# Patient Record
Sex: MALE | Race: WHITE | NOT HISPANIC OR LATINO | Employment: FULL TIME | URBAN - METROPOLITAN AREA
[De-identification: names, ages, dates, MRNs, and addresses within clinical notes are randomized per-mention and may not be internally consistent; named-entity substitution may affect disease eponyms.]

---

## 2017-04-04 ENCOUNTER — APPOINTMENT (OUTPATIENT)
Dept: URGENT CARE | Facility: CLINIC | Age: 42
End: 2017-04-04
Payer: COMMERCIAL

## 2017-04-06 ENCOUNTER — HOSPITAL ENCOUNTER (OUTPATIENT)
Dept: LAB | Facility: MEDICAL CENTER | Age: 42
End: 2017-04-06
Attending: UROLOGY
Payer: COMMERCIAL

## 2017-04-06 ENCOUNTER — HOSPITAL ENCOUNTER (OUTPATIENT)
Dept: LAB | Facility: MEDICAL CENTER | Age: 42
End: 2017-04-06
Attending: NURSE PRACTITIONER
Payer: COMMERCIAL

## 2017-04-06 DIAGNOSIS — I10 ESSENTIAL HYPERTENSION: ICD-10-CM

## 2017-04-06 DIAGNOSIS — R53.83 OTHER FATIGUE: ICD-10-CM

## 2017-04-06 DIAGNOSIS — Z76.89 ENCOUNTER TO ESTABLISH CARE WITH NEW DOCTOR: ICD-10-CM

## 2017-04-06 DIAGNOSIS — E78.5 HYPERLIPIDEMIA, UNSPECIFIED HYPERLIPIDEMIA TYPE: ICD-10-CM

## 2017-04-06 LAB
25(OH)D3 SERPL-MCNC: 26 NG/ML (ref 30–100)
ALBUMIN SERPL BCP-MCNC: 4.4 G/DL (ref 3.2–4.9)
ALBUMIN/GLOB SERPL: 1.3 G/DL
ALP SERPL-CCNC: 51 U/L (ref 30–99)
ALT SERPL-CCNC: 25 U/L (ref 2–50)
ANION GAP SERPL CALC-SCNC: 8 MMOL/L (ref 0–11.9)
AST SERPL-CCNC: 15 U/L (ref 12–45)
BASOPHILS # BLD AUTO: 1.4 % (ref 0–1.8)
BASOPHILS # BLD: 0.09 K/UL (ref 0–0.12)
BILIRUB SERPL-MCNC: 0.6 MG/DL (ref 0.1–1.5)
BUN SERPL-MCNC: 15 MG/DL (ref 8–22)
CALCIUM SERPL-MCNC: 9.4 MG/DL (ref 8.5–10.5)
CHLORIDE SERPL-SCNC: 108 MMOL/L (ref 96–112)
CHOLEST SERPL-MCNC: 231 MG/DL (ref 100–199)
CO2 SERPL-SCNC: 24 MMOL/L (ref 20–33)
CREAT SERPL-MCNC: 0.82 MG/DL (ref 0.5–1.4)
EOSINOPHIL # BLD AUTO: 0.07 K/UL (ref 0–0.51)
EOSINOPHIL NFR BLD: 1.1 % (ref 0–6.9)
ERYTHROCYTE [DISTWIDTH] IN BLOOD BY AUTOMATED COUNT: 46.6 FL (ref 35.9–50)
GFR SERPL CREATININE-BSD FRML MDRD: >60 ML/MIN/1.73 M 2
GLOBULIN SER CALC-MCNC: 3.3 G/DL (ref 1.9–3.5)
GLUCOSE SERPL-MCNC: 90 MG/DL (ref 65–99)
HCT VFR BLD AUTO: 47.7 % (ref 42–52)
HDLC SERPL-MCNC: 42 MG/DL
HGB BLD-MCNC: 15.3 G/DL (ref 14–18)
IMM GRANULOCYTES # BLD AUTO: 0.04 K/UL (ref 0–0.11)
IMM GRANULOCYTES NFR BLD AUTO: 0.6 % (ref 0–0.9)
LDLC SERPL CALC-MCNC: 167 MG/DL
LYMPHOCYTES # BLD AUTO: 2.06 K/UL (ref 1–4.8)
LYMPHOCYTES NFR BLD: 32.1 % (ref 22–41)
MCH RBC QN AUTO: 29.6 PG (ref 27–33)
MCHC RBC AUTO-ENTMCNC: 32.1 G/DL (ref 33.7–35.3)
MCV RBC AUTO: 92.3 FL (ref 81.4–97.8)
MONOCYTES # BLD AUTO: 0.42 K/UL (ref 0–0.85)
MONOCYTES NFR BLD AUTO: 6.6 % (ref 0–13.4)
NEUTROPHILS # BLD AUTO: 3.73 K/UL (ref 1.82–7.42)
NEUTROPHILS NFR BLD: 58.2 % (ref 44–72)
NRBC # BLD AUTO: 0 K/UL
NRBC BLD AUTO-RTO: 0 /100 WBC
PLATELET # BLD AUTO: 196 K/UL (ref 164–446)
PMV BLD AUTO: 11.5 FL (ref 9–12.9)
POTASSIUM SERPL-SCNC: 4.1 MMOL/L (ref 3.6–5.5)
PROT SERPL-MCNC: 7.7 G/DL (ref 6–8.2)
RBC # BLD AUTO: 5.17 M/UL (ref 4.7–6.1)
SODIUM SERPL-SCNC: 140 MMOL/L (ref 135–145)
TRIGL SERPL-MCNC: 108 MG/DL (ref 0–149)
TSH SERPL DL<=0.005 MIU/L-ACNC: 2.98 UIU/ML (ref 0.3–3.7)
WBC # BLD AUTO: 6.4 K/UL (ref 4.8–10.8)

## 2017-04-06 PROCEDURE — 82306 VITAMIN D 25 HYDROXY: CPT

## 2017-04-06 PROCEDURE — 80053 COMPREHEN METABOLIC PANEL: CPT

## 2017-04-06 PROCEDURE — 36415 COLL VENOUS BLD VENIPUNCTURE: CPT

## 2017-04-06 PROCEDURE — 80061 LIPID PANEL: CPT

## 2017-04-06 PROCEDURE — 85025 COMPLETE CBC W/AUTO DIFF WBC: CPT

## 2017-04-06 PROCEDURE — 84443 ASSAY THYROID STIM HORMONE: CPT

## 2017-04-06 PROCEDURE — 84153 ASSAY OF PSA TOTAL: CPT

## 2017-04-06 PROCEDURE — 84154 ASSAY OF PSA FREE: CPT

## 2017-04-08 LAB
PSA FREE MFR SERPL: 15 %
PSA FREE SERPL-MCNC: 1 NG/ML
PSA SERPL-MCNC: 6.6 NG/ML (ref 0–4)

## 2017-05-24 ENCOUNTER — TELEPHONE (OUTPATIENT)
Dept: MEDICAL GROUP | Facility: PHYSICIAN GROUP | Age: 42
End: 2017-05-24

## 2017-05-24 NOTE — TELEPHONE ENCOUNTER
ESTABLISHED PATIENT PRE-VISIT PLANNING     Note: Patient will not be contacted if there is no indication to call.     1.  Reviewed note from last office visit with PCP and/or other med group provider: Yes    2.  If any orders were placed at last visit, do we have Results/Consult Notes?        •  Labs - Labs ordered, completed and results are in chart.       •  Imaging - Imaging was not ordered at last office visit.       •  Referrals - No referrals were ordered at last office visit.    3.  Immunizations were updated in Epic using WebIZ?: No WebIZ record       •  Web Iz Recommendations: TDAP    4.  Patient is due for the following Health Maintenance Topics:   Health Maintenance Due   Topic Date Due   • IMM DTaP/Tdap/Td Vaccine (1 - Tdap) 01/12/1992

## 2017-05-25 ENCOUNTER — OFFICE VISIT (OUTPATIENT)
Dept: MEDICAL GROUP | Facility: PHYSICIAN GROUP | Age: 42
End: 2017-05-25
Payer: COMMERCIAL

## 2017-05-25 VITALS
HEIGHT: 72 IN | TEMPERATURE: 99 F | WEIGHT: 283 LBS | HEART RATE: 84 BPM | DIASTOLIC BLOOD PRESSURE: 80 MMHG | RESPIRATION RATE: 16 BRPM | SYSTOLIC BLOOD PRESSURE: 118 MMHG | BODY MASS INDEX: 38.33 KG/M2 | OXYGEN SATURATION: 95 %

## 2017-05-25 DIAGNOSIS — R60.9 EDEMA, UNSPECIFIED TYPE: ICD-10-CM

## 2017-05-25 DIAGNOSIS — Z23 NEED FOR VACCINATION: ICD-10-CM

## 2017-05-25 PROCEDURE — 99214 OFFICE O/P EST MOD 30 MIN: CPT | Mod: 25 | Performed by: NURSE PRACTITIONER

## 2017-05-25 PROCEDURE — 90715 TDAP VACCINE 7 YRS/> IM: CPT | Performed by: NURSE PRACTITIONER

## 2017-05-25 PROCEDURE — 90471 IMMUNIZATION ADMIN: CPT | Performed by: NURSE PRACTITIONER

## 2017-05-25 RX ORDER — HYDROCHLOROTHIAZIDE 12.5 MG/1
12.5 TABLET ORAL DAILY
Qty: 30 TAB | Refills: 3 | Status: SHIPPED | OUTPATIENT
Start: 2017-05-25 | End: 2017-09-18 | Stop reason: SDUPTHER

## 2017-05-25 NOTE — ASSESSMENT & PLAN NOTE
Has been noticing that his legs are both swollen at the end of the day.  He reports that they are back to normal when he wakes up in the morning.  Is on his feet all day, and then walks or goes to the gym after work.  Will work on elevating his legs after work, and will consider compression socks during the day.  We will start HCTZ 12.5 daily to see if this helps.  He will monitor his blood pressure.

## 2017-05-25 NOTE — PROGRESS NOTES
Chief Complaint   Patient presents with   • Other     water retention    • Injections     tdap        HISTORY OF PRESENT ILLNESS: Patient is a 42 y.o. male established patient who presents today to discuss the following issues:    Need for vaccination  Due for Tdap.    Edema  Has been noticing that his legs are both swollen at the end of the day.  He reports that they are back to normal when he wakes up in the morning.  Is on his feet all day, and then walks or goes to the gym after work.  Will work on elevating his legs after work, and will consider compression socks during the day.  We will start HCTZ 12.5 daily to see if this helps.  He will monitor his blood pressure.    BMI 38.0-38.9,adult  Patient is aware of BMI elevation.  Brief discussion of diet, exercise, and lifestyle modification.        Patient Active Problem List    Diagnosis Date Noted   • Edema 05/25/2017   • BMI 38.0-38.9,adult 05/25/2017   • Need for vaccination 05/25/2017   • Essential hypertension 12/08/2016   • Hyperlipidemia 12/08/2016       Allergies:Review of patient's allergies indicates no known allergies.    Current Outpatient Prescriptions   Medication Sig Dispense Refill   • vitamin D (CHOLECALCIFEROL) 1000 UNIT Tab Take 1,000 Units by mouth every day.     • hydrochlorothiazide (HYDRODIURIL) 12.5 MG tablet Take 1 Tab by mouth every day. 30 Tab 3   • oxybutynin SR (DITROPAN-XL) 10 MG CR tablet Take 10 mg by mouth every day.     • tamsulosin (FLOMAX) 0.4 MG capsule Take 0.4 mg by mouth ONE-HALF HOUR AFTER BREAKFAST.     • amlodipine (NORVASC) 10 MG Tab Take 1 Tab by mouth every day. 90 Tab 3   • lovastatin (MEVACOR) 20 MG Tab Take 1 Tab by mouth every day. 90 Tab 3     No current facility-administered medications for this visit.       Social History   Substance Use Topics   • Smoking status: Never Smoker    • Smokeless tobacco: Never Used   • Alcohol Use: 0.0 oz/week     0 Standard drinks or equivalent per week      Comment: socially  "      No family status information on file.   History reviewed. No pertinent family history.    Review of Systems:   Constitutional: Negative for fever, chills, weight loss and malaise/fatigue.   HENT: Negative for ear pain, nosebleeds, congestion, sore throat and neck pain.    Eyes: Negative for blurred vision.   Respiratory: Negative for cough, sputum production, shortness of breath and wheezing.    Cardiovascular: Negative for chest pain, palpitations, and orthopnea.  Positive for bilateral leg swelling.   Gastrointestinal: Negative for heartburn, nausea, vomiting and abdominal pain.   Genitourinary: Negative for dysuria, urgency and frequency.   Musculoskeletal: Negative for myalgias, joint pain, and back pain.  Skin: Negative for rash and itching.   Neurological: Negative for dizziness, tingling, tremors, sensory change, focal weakness and headaches.   Endo/Heme/Allergies: Does not bruise/bleed easily.   Psychiatric/Behavioral: Negative for depression, suicidal ideas and memory loss.  The patient is not nervous/anxious and does not have insomnia.    All other systems reviewed and are negative except as in HPI.    Exam:  Blood pressure 118/80, pulse 84, temperature 37.2 °C (99 °F), resp. rate 16, height 1.829 m (6' 0.01\"), weight 128.368 kg (283 lb), SpO2 95 %.  General:  Well nourished, well developed male in NAD  Head: Grossly normal.  Neck: Supple without JVD or bruit. Thyroid is not enlarged.  Pulmonary: Clear to ausculation. Normal effort. No rales, ronchi, or wheezing.  Cardiovascular: Regular rate and rhythm without murmur.   Extremities: No clubbing and cyanosis.  Positive for 1+ bilateral ankle pitting edema.  Skin: Intact with no obvious rashes or lesions.  Neuro: Grossly intact.  Psych: Alert and oriented x 3.  Mood and affect appropriate.    Medical decision-making and discussion: Jeb is here today to discuss ankle edema.  A prescription for HCTZ was sent to his pharmacy, and he was given a Tdap " injection.  He will follow-up here as needed.    I have placed the below orders and discussed them with an approved delegating provider. The MA is performing the below orders under the direction of Dr. Rodríguez, who have provided verbal consent for supervision.            Assessment/Plan:  1. BMI 38.0-38.9,adult  Patient identified as having weight management issue.  Appropriate orders and counseling given.   2. Need for vaccination  Tdap =>8yo IM   3. Edema, unspecified type  hydrochlorothiazide (HYDRODIURIL) 12.5 MG tablet       Return if symptoms worsen or fail to improve.    Please note that this dictation was created using voice recognition software. I have made every reasonable attempt to correct obvious errors, but I expect that there are errors of grammar and possibly content that I did not discover before finalizing the note.

## 2017-05-25 NOTE — MR AVS SNAPSHOT
"        Jeb Morley   2017 10:00 AM   Office Visit   MRN: [id number]    Department:  [facility]   Dept Phone:  [phone]    Description:  Male : 1975   Provider:  SONI Fish           Reason for Visit     Other water retention     Injections tdap       Allergies as of 2017     No Known Allergies      You were diagnosed with     BMI 38.0-38.9,adult   [939139]       Need for vaccination   [875871]       Edema, unspecified type   [[id number]]         Vital Signs     Blood Pressure Pulse Temperature Respirations Height Weight    118/80 mmHg 84 37.2 °C (99 °F) 16 1.829 m (6' 0.01\") 128.368 kg (283 lb)    Body Mass Index Oxygen Saturation Smoking Status             38.37 kg/m2 95% Never Smoker          Basic Information     Date Of Birth Sex Race Ethnicity Preferred Language    1975 Male White Non- English      Problem List              ICD-10-CM Priority Class Noted - Resolved    Essential hypertension I10   2016 - Present    Hyperlipidemia E78.5   2016 - Present    Edema R60.9   2017 - Present    BMI 38.0-38.9,adult Z68.38   2017 - Present    Need for vaccination Z23   2017 - Present      Health Maintenance        Date Due Completion Dates    IMM DTaP/Tdap/Td Vaccine (1 - Tdap) 1992 ---            Current Immunizations     Tdap Vaccine  Incomplete      Below and/or attached are the medications your provider expects you to take. Review all of your home medications and newly ordered medications with your provider and/or pharmacist. Follow medication instructions as directed by your provider and/or pharmacist. Please keep your medication list with you and share with your provider. Update the information when medications are discontinued, doses are changed, or new medications (including over-the-counter products) are added; and carry medication information at all times in the event of emergency situations     Allergies:  No Known Allergies "          Medications  Valid as of: May 25, 2017 - 10:37 AM    Generic Name Brand Name Tablet Size Instructions for use    AmLODIPine Besylate (Tab) NORVASC 10 MG Take 1 Tab by mouth every day.        Cholecalciferol (Tab) cholecalciferol 1000 UNIT Take 1,000 Units by mouth every day.        HydroCHLOROthiazide (Tab) HYDRODIURIL 12.5 MG Take 1 Tab by mouth every day.        Lovastatin (Tab) MEVACOR 20 MG Take 1 Tab by mouth every day.        Oxybutynin Chloride (TABLET SR 24 HR) DITROPAN-XL 10 MG Take 10 mg by mouth every day.        Tamsulosin HCl (Cap) FLOMAX 0.4 MG Take 0.4 mg by mouth ONE-HALF HOUR AFTER BREAKFAST.        .                 Medicines prescribed today were sent to:     [facility] [address] [place] [address] - [address] [facility] [address]    Phone: [phone] Fax: [phone]    Open 24 Hours?: No      Medication refill instructions:       If your prescription bottle indicates you have medication refills left, it is not necessary to call your provider’s office. Please contact your pharmacy and they will refill your medication.    If your prescription bottle indicates you do not have any refills left, you may request refills at any time through one of the following ways: The online [facility] system (except Urgent Care), by calling your provider’s office, or by asking your pharmacy to contact your provider’s office with a refill request. Medication refills are processed only during regular business hours and may not be available until the next business day. Your provider may request additional information or to have a follow-up visit with you prior to refilling your medication.   *Please Note: Medication refills are assigned a new Rx number when refilled electronically. Your pharmacy may indicate that no refills were authorized even though a new prescription for the same medication is available at the pharmacy. Please request the medicine by name with the pharmacy before contacting your  provider for a refill.           MyChart Access Code: Activation code not generated  Current MyChart Status: Active

## 2017-09-05 ENCOUNTER — OFFICE VISIT (OUTPATIENT)
Dept: MEDICAL GROUP | Facility: PHYSICIAN GROUP | Age: 42
End: 2017-09-05
Payer: COMMERCIAL

## 2017-09-05 VITALS
RESPIRATION RATE: 20 BRPM | DIASTOLIC BLOOD PRESSURE: 80 MMHG | OXYGEN SATURATION: 96 % | WEIGHT: 280 LBS | TEMPERATURE: 102.6 F | HEIGHT: 72 IN | HEART RATE: 116 BPM | BODY MASS INDEX: 37.93 KG/M2 | SYSTOLIC BLOOD PRESSURE: 122 MMHG

## 2017-09-05 DIAGNOSIS — R50.9 FEVER, UNSPECIFIED FEVER CAUSE: ICD-10-CM

## 2017-09-05 DIAGNOSIS — B34.9 VIRAL SYNDROME: ICD-10-CM

## 2017-09-05 PROBLEM — Z23 NEED FOR VACCINATION: Status: RESOLVED | Noted: 2017-05-25 | Resolved: 2017-09-05

## 2017-09-05 PROBLEM — R60.9 EDEMA: Status: RESOLVED | Noted: 2017-05-25 | Resolved: 2017-09-05

## 2017-09-05 LAB
FLUAV+FLUBV AG SPEC QL IA: NEGATIVE
INT CON NEG: NEGATIVE
INT CON POS: POSITIVE

## 2017-09-05 PROCEDURE — 87804 INFLUENZA ASSAY W/OPTIC: CPT | Performed by: NURSE PRACTITIONER

## 2017-09-05 PROCEDURE — 99214 OFFICE O/P EST MOD 30 MIN: CPT | Performed by: NURSE PRACTITIONER

## 2017-09-05 RX ORDER — OSELTAMIVIR PHOSPHATE 75 MG/1
75 CAPSULE ORAL 2 TIMES DAILY
Qty: 10 CAP | Refills: 0 | Status: SHIPPED
Start: 2017-09-05 | End: 2020-03-16

## 2017-09-05 NOTE — PROGRESS NOTES
Chief Complaint   Patient presents with   • Fever     x4 days    • Migraine     x4days        HISTORY OF PRESENT ILLNESS: Patient is a 42 y.o. male established patient who presents today to discuss the following issues:    Fever  Started 2 days ago with significant fever and chills.  Feels terrible. Complains os headache and body aches.  Rare dry cough and dizziness.  No congestion or sore throat.  POCT influenza was negative, but I will treat empirically based on symptoms.      Patient Active Problem List    Diagnosis Date Noted   • Fever 09/05/2017   • BMI 38.0-38.9,adult 05/25/2017   • Essential hypertension 12/08/2016   • Hyperlipidemia 12/08/2016       Allergies:Review of patient's allergies indicates no known allergies.    Current Outpatient Prescriptions   Medication Sig Dispense Refill   • oseltamivir (TAMIFLU) 75 MG Cap Take 1 Cap by mouth 2 times a day. 10 Cap 0   • vitamin D (CHOLECALCIFEROL) 1000 UNIT Tab Take 1,000 Units by mouth every day.     • hydrochlorothiazide (HYDRODIURIL) 12.5 MG tablet Take 1 Tab by mouth every day. 30 Tab 3   • oxybutynin SR (DITROPAN-XL) 10 MG CR tablet Take 10 mg by mouth every day.     • tamsulosin (FLOMAX) 0.4 MG capsule Take 0.4 mg by mouth ONE-HALF HOUR AFTER BREAKFAST.     • amlodipine (NORVASC) 10 MG Tab Take 1 Tab by mouth every day. 90 Tab 3   • lovastatin (MEVACOR) 20 MG Tab Take 1 Tab by mouth every day. 90 Tab 3     No current facility-administered medications for this visit.        Social History   Substance Use Topics   • Smoking status: Never Smoker   • Smokeless tobacco: Never Used   • Alcohol use 0.0 oz/week      Comment: socially       No family status information on file.   History reviewed. No pertinent family history.    Review of Systems:   Constitutional: Positive for fever, chills, and malaise/fatigue.   HENT: Negative for ear pain, nosebleeds, congestion, sore throat and neck pain.    Eyes: Negative for blurred vision.   Respiratory: Negative for  cough, sputum production, shortness of breath and wheezing.    Cardiovascular: Negative for chest pain, palpitations, orthopnea and leg swelling.   Gastrointestinal: Negative for heartburn, nausea, vomiting and abdominal pain.   Genitourinary: Negative for dysuria, urgency and frequency.   Musculoskeletal: Negative for joint pain and back pain.Positive for myalgias.  Skin: Negative for rash and itching.   Neurological: Negative for dizziness, tingling, tremors, sensory change, focal weakness and headaches.   Endo/Heme/Allergies: Does not bruise/bleed easily.   Psychiatric/Behavioral: Negative for depression, suicidal ideas and memory loss.  The patient is not nervous/anxious and does not have insomnia.    All other systems reviewed and are negative except as in HPI.    Exam:  Blood pressure 122/80, pulse (!) 116, temperature (!) 39.2 °C (102.6 °F), resp. rate 20, height 1.829 m (6'), weight (!) 127 kg (280 lb), SpO2 96 %.  General:  Well nourished, well developed male in NAD  Head: Grossly normal.  Neck: Supple without JVD or bruit. Thyroid is not enlarged.  Pulmonary: Clear to ausculation. Normal effort. No rales, ronchi, or wheezing.  Cardiovascular: Regular rate and rhythm without murmur.   Extremities: No clubbing, cyanosis, or edema.  Skin: Intact with no obvious rashes or lesions.  Neuro: Grossly intact.  Psych: Alert and oriented x 3.  Mood and affect appropriate.    Medical decision-making and discussion: Jeb is here today to discuss fever and body aches.  A POCT flu was negative, but a prescription for tamiflu was sent to his pharmacy.  He was encouraged to rest, stay hydrated, and to treat his symptoms with otc meds.  He will follow-up if he is worse/no better.            Assessment/Plan:  1. Fever, unspecified fever cause  oseltamivir (TAMIFLU) 75 MG Cap   2. Viral syndrome  oseltamivir (TAMIFLU) 75 MG Cap       Return if symptoms worsen or fail to improve.    Please note that this dictation was  created using voice recognition software. I have made every reasonable attempt to correct obvious errors, but I expect that there are errors of grammar and possibly content that I did not discover before finalizing the note.

## 2017-09-05 NOTE — ASSESSMENT & PLAN NOTE
Started 2 days ago with significant fever and chills.  Feels terrible. Complains os headache and body aches.  Rare dry cough and dizziness.  No congestion or sore throat.  POCT influenza was negative, but I will treat empirically based on symptoms.

## 2017-09-07 ENCOUNTER — OFFICE VISIT (OUTPATIENT)
Dept: URGENT CARE | Facility: PHYSICIAN GROUP | Age: 42
End: 2017-09-07
Payer: COMMERCIAL

## 2017-09-07 VITALS
RESPIRATION RATE: 16 BRPM | DIASTOLIC BLOOD PRESSURE: 78 MMHG | OXYGEN SATURATION: 97 % | HEART RATE: 92 BPM | SYSTOLIC BLOOD PRESSURE: 128 MMHG | BODY MASS INDEX: 37.79 KG/M2 | TEMPERATURE: 100.9 F | HEIGHT: 72 IN | WEIGHT: 279 LBS

## 2017-09-07 DIAGNOSIS — L03.115 CELLULITIS OF RIGHT LOWER EXTREMITY: ICD-10-CM

## 2017-09-07 PROCEDURE — 99213 OFFICE O/P EST LOW 20 MIN: CPT | Performed by: FAMILY MEDICINE

## 2017-09-07 RX ORDER — CLINDAMYCIN HYDROCHLORIDE 300 MG/1
300 CAPSULE ORAL 3 TIMES DAILY
Qty: 30 CAP | Refills: 0 | Status: SHIPPED | OUTPATIENT
Start: 2017-09-07 | End: 2017-09-17

## 2017-09-07 ASSESSMENT — ENCOUNTER SYMPTOMS
EYE REDNESS: 0
FOCAL WEAKNESS: 0
EYE DISCHARGE: 0
CHILLS: 1
SENSORY CHANGE: 0

## 2017-09-07 NOTE — PROGRESS NOTES
"Subjective:      Jeb Morley is a 42 y.o. male who presents with Leg Pain (upper R thigh) and Headache (top of head, coughing and movement makes it worse)            4 days \"bump\" right upper thigh. Red, moderately painful, warm. Progressively worsening. No red streaking. Has had recent fevers and HA suspected to be related to viral illness. Mild cough. Previous N/V resolved. No function or sensory loss. No neck stiffness. No purulent drainage. No other aggravating or alleviating factors.            Review of Systems   Constitutional: Positive for chills and malaise/fatigue.   Eyes: Negative for discharge and redness.   Skin: Negative for itching and rash.   Neurological: Negative for sensory change and focal weakness.     .  Medications, Allergies, and current problem list reviewed today in Epic       Objective:     /78   Pulse 92   Temp (!) 38.3 °C (100.9 °F)   Resp 16   Ht 1.829 m (6')   Wt (!) 126.6 kg (279 lb)   SpO2 97%   BMI 37.84 kg/m²      Physical Exam   Constitutional: He appears well-developed and well-nourished. No distress.   HENT:   Head: Normocephalic and atraumatic.   Musculoskeletal:        Legs:  Neurological:   Speech is clear. Patient is appropriate and cooperative.     Skin: Skin is warm and dry.               Assessment/Plan:     1. Cellulitis of right lower extremity    - clindamycin (CLEOCIN) 300 MG Cap; Take 1 Cap by mouth 3 times a day for 10 days.  Dispense: 30 Cap; Refill: 0  - Differential diagnosis, natural history, supportive care, and indications for immediate follow-up discussed at length.     "

## 2017-09-18 DIAGNOSIS — R60.9 EDEMA, UNSPECIFIED TYPE: ICD-10-CM

## 2017-09-19 RX ORDER — HYDROCHLOROTHIAZIDE 12.5 MG/1
TABLET ORAL
Qty: 90 TAB | Refills: 1 | Status: SHIPPED | OUTPATIENT
Start: 2017-09-19 | End: 2018-03-10 | Stop reason: SDUPTHER

## 2017-09-19 NOTE — TELEPHONE ENCOUNTER
Was the patient seen in the last year in this department? Yes     Does patient have an active prescription for medications requested? No     Received Request Via: Pharmacy      Pt met protocol?: Yes, OV last week   BP Readings from Last 1 Encounters:   09/07/17 128/78

## 2017-09-19 NOTE — TELEPHONE ENCOUNTER
Refill X 6 months, sent to pharmacy.Pt. Seen in the last 6 months per protocol.   Lab Results   Component Value Date/Time    SODIUM 140 04/06/2017 08:34 AM    POTASSIUM 4.1 04/06/2017 08:34 AM    CHLORIDE 108 04/06/2017 08:34 AM    CO2 24 04/06/2017 08:34 AM    GLUCOSE 90 04/06/2017 08:34 AM    BUN 15 04/06/2017 08:34 AM    CREATININE 0.82 04/06/2017 08:34 AM

## 2017-10-18 ENCOUNTER — HOSPITAL ENCOUNTER (OUTPATIENT)
Dept: LAB | Facility: MEDICAL CENTER | Age: 42
End: 2017-10-18
Attending: UROLOGY
Payer: COMMERCIAL

## 2017-10-18 LAB — PSA SERPL-MCNC: 8.56 NG/ML (ref 0–4)

## 2017-10-18 PROCEDURE — 84153 ASSAY OF PSA TOTAL: CPT

## 2017-10-18 PROCEDURE — 36415 COLL VENOUS BLD VENIPUNCTURE: CPT

## 2017-11-06 ENCOUNTER — OFFICE VISIT (OUTPATIENT)
Dept: MEDICAL GROUP | Facility: PHYSICIAN GROUP | Age: 42
End: 2017-11-06
Payer: COMMERCIAL

## 2017-11-06 VITALS
RESPIRATION RATE: 16 BRPM | WEIGHT: 288 LBS | TEMPERATURE: 98.4 F | HEART RATE: 80 BPM | HEIGHT: 72 IN | SYSTOLIC BLOOD PRESSURE: 128 MMHG | DIASTOLIC BLOOD PRESSURE: 80 MMHG | OXYGEN SATURATION: 94 % | BODY MASS INDEX: 39.01 KG/M2

## 2017-11-06 DIAGNOSIS — N40.1 BENIGN PROSTATIC HYPERPLASIA WITH LOWER URINARY TRACT SYMPTOMS, SYMPTOM DETAILS UNSPECIFIED: ICD-10-CM

## 2017-11-06 PROCEDURE — 99213 OFFICE O/P EST LOW 20 MIN: CPT | Performed by: NURSE PRACTITIONER

## 2017-11-06 RX ORDER — PHENTERMINE HYDROCHLORIDE 37.5 MG/1
37.5 TABLET ORAL
Qty: 30 TAB | Refills: 0 | Status: SHIPPED | OUTPATIENT
Start: 2017-11-06 | End: 2017-12-13 | Stop reason: SDUPTHER

## 2017-11-08 PROBLEM — N40.1 BENIGN PROSTATIC HYPERPLASIA WITH LOWER URINARY TRACT SYMPTOMS: Status: ACTIVE | Noted: 2017-11-08

## 2017-11-08 PROBLEM — R50.9 FEVER: Status: RESOLVED | Noted: 2017-09-05 | Resolved: 2017-11-08

## 2017-11-08 NOTE — ASSESSMENT & PLAN NOTE
Patient is aware of BMI elevation.  Brief discussion of diet, exercise, and lifestyle modification.  He would like to restart phentermine.  He has done well with it in the past without side effects.

## 2017-11-08 NOTE — PROGRESS NOTES
Chief Complaint   Patient presents with   • Follow-Up     water pill        HISTORY OF PRESENT ILLNESS: Patient is a 42 y.o. male established patient who presents today to discuss the following issues:    BMI 39.0-39.9,adult  Patient is aware of BMI elevation.  Brief discussion of diet, exercise, and lifestyle modification.  He would like to restart phentermine.  He has done well with it in the past without side effects.      Benign prostatic hyperplasia with lower urinary tract symptoms  Followed by urology. Is due to have an MRI soon, as his PSA has increased.      Patient Active Problem List    Diagnosis Date Noted   • Benign prostatic hyperplasia with lower urinary tract symptoms 11/08/2017   • BMI 39.0-39.9,adult 05/25/2017   • Essential hypertension 12/08/2016   • Hyperlipidemia 12/08/2016       Allergies:Review of patient's allergies indicates no known allergies.    Current Outpatient Prescriptions   Medication Sig Dispense Refill   • phentermine (ADIPEX-P) 37.5 MG tablet Take 1 Tab by mouth every morning before breakfast. 30 Tab 0   • hydrochlorothiazide (HYDRODIURIL) 12.5 MG tablet TAKE ONE TABLET BY MOUTH ONCE DAILY 90 Tab 1   • vitamin D (CHOLECALCIFEROL) 1000 UNIT Tab Take 1,000 Units by mouth every day.     • oxybutynin SR (DITROPAN-XL) 10 MG CR tablet Take 10 mg by mouth every day.     • tamsulosin (FLOMAX) 0.4 MG capsule Take 0.4 mg by mouth ONE-HALF HOUR AFTER BREAKFAST.     • amlodipine (NORVASC) 10 MG Tab Take 1 Tab by mouth every day. 90 Tab 3   • lovastatin (MEVACOR) 20 MG Tab Take 1 Tab by mouth every day. 90 Tab 3   • oseltamivir (TAMIFLU) 75 MG Cap Take 1 Cap by mouth 2 times a day. 10 Cap 0     No current facility-administered medications for this visit.        Social History   Substance Use Topics   • Smoking status: Never Smoker   • Smokeless tobacco: Never Used   • Alcohol use 0.0 oz/week      Comment: socially       No family status information on file.   History reviewed. No pertinent  family history.    Review of Systems:   Constitutional: Negative for fever, chills, weight loss and malaise/fatigue.   HENT: Negative for ear pain, nosebleeds, congestion, sore throat and neck pain.    Eyes: Negative for blurred vision.   Respiratory: Negative for cough, sputum production, shortness of breath and wheezing.    Cardiovascular: Negative for chest pain, palpitations, orthopnea and leg swelling.   Gastrointestinal: Negative for heartburn, nausea, vomiting and abdominal pain.   Genitourinary: Negative for dysuria, urgency and frequency.   Musculoskeletal: Negative for myalgias, joint pain, and back pain.  Skin: Negative for rash and itching.   Neurological: Negative for dizziness, tingling, tremors, sensory change, focal weakness and headaches.   Endo/Heme/Allergies: Does not bruise/bleed easily.   Psychiatric/Behavioral: Negative for depression, suicidal ideas and memory loss.  The patient is not nervous/anxious and does not have insomnia.    All other systems reviewed and are negative except as in HPI.    Exam:  Blood pressure 128/80, pulse 80, temperature 36.9 °C (98.4 °F), resp. rate 16, height 1.829 m (6'), weight (!) 130.6 kg (288 lb), SpO2 94 %.  General:  Well nourished, well developed male in NAD  Head: Grossly normal.  Neck: Supple without JVD or bruit. Thyroid is not enlarged.  Pulmonary: Clear to ausculation. Normal effort. No rales, ronchi, or wheezing.  Cardiovascular: Regular rate and rhythm without murmur.   Extremities: No clubbing, cyanosis, or edema.  Skin: Intact with no obvious rashes or lesions.  Vascular dermatitis on bilateral lower extremities.  Neuro: Grossly intact.  Psych: Alert and oriented x 3.  Mood and affect appropriate.    Medical decision-making and discussion: Jeb is here today to discuss a few issues.  He will continue to see urology, and he was given a prescription for phentermine.  He will follow-up here as needed.          Assessment/Plan:  1. BMI  39.0-39.9,adult  phentermine (ADIPEX-P) 37.5 MG tablet   2. Benign prostatic hyperplasia with lower urinary tract symptoms, symptom details unspecified         Return if symptoms worsen or fail to improve.    Please note that this dictation was created using voice recognition software. I have made every reasonable attempt to correct obvious errors, but I expect that there are errors of grammar and possibly content that I did not discover before finalizing the note.

## 2017-12-08 DIAGNOSIS — I10 ESSENTIAL HYPERTENSION: ICD-10-CM

## 2017-12-08 DIAGNOSIS — E78.5 HYPERLIPIDEMIA, UNSPECIFIED HYPERLIPIDEMIA TYPE: ICD-10-CM

## 2017-12-08 RX ORDER — LOVASTATIN 20 MG/1
TABLET ORAL
Qty: 90 TAB | Refills: 1 | Status: SHIPPED | OUTPATIENT
Start: 2017-12-08 | End: 2018-03-22 | Stop reason: SDUPTHER

## 2017-12-08 RX ORDER — AMLODIPINE BESYLATE 10 MG/1
TABLET ORAL
Qty: 90 TAB | Refills: 1 | Status: SHIPPED | OUTPATIENT
Start: 2017-12-08 | End: 2018-03-22 | Stop reason: SDUPTHER

## 2017-12-09 NOTE — TELEPHONE ENCOUNTER
Pt has had OV within the 12 month protocol and lipid panel is current. 6 month supply sent to pharmacy.   Lab Results   Component Value Date/Time    CHOLSTRLTOT 231 (H) 04/06/2017 08:34 AM     (H) 04/06/2017 08:34 AM    HDL 42 04/06/2017 08:34 AM    TRIGLYCERIDE 108 04/06/2017 08:34 AM       Lab Results   Component Value Date/Time    SODIUM 140 04/06/2017 08:34 AM    POTASSIUM 4.1 04/06/2017 08:34 AM    CHLORIDE 108 04/06/2017 08:34 AM    CO2 24 04/06/2017 08:34 AM    GLUCOSE 90 04/06/2017 08:34 AM    BUN 15 04/06/2017 08:34 AM    CREATININE 0.82 04/06/2017 08:34 AM     Lab Results   Component Value Date/Time    ALKPHOSPHAT 51 04/06/2017 08:34 AM    ASTSGOT 15 04/06/2017 08:34 AM    ALTSGPT 25 04/06/2017 08:34 AM    TBILIRUBIN 0.6 04/06/2017 08:34 AM

## 2017-12-12 ENCOUNTER — HOSPITAL ENCOUNTER (OUTPATIENT)
Dept: LAB | Facility: MEDICAL CENTER | Age: 42
End: 2017-12-12
Attending: UROLOGY
Payer: COMMERCIAL

## 2017-12-12 PROCEDURE — 36415 COLL VENOUS BLD VENIPUNCTURE: CPT

## 2017-12-12 PROCEDURE — 84153 ASSAY OF PSA TOTAL: CPT

## 2017-12-12 PROCEDURE — 84154 ASSAY OF PSA FREE: CPT

## 2017-12-13 NOTE — TELEPHONE ENCOUNTER
Was the patient seen in the last year in this department? Yes 11/06/17    Does patient have an active prescription for medications requested? No     Received Request Via: Pharmacy

## 2017-12-14 LAB
PSA FREE MFR SERPL: 13 %
PSA FREE SERPL-MCNC: 1.3 NG/ML
PSA SERPL-MCNC: 9.8 NG/ML (ref 0–4)

## 2017-12-14 RX ORDER — PHENTERMINE HYDROCHLORIDE 37.5 MG/1
TABLET ORAL
Qty: 30 TAB | Refills: 0 | Status: SHIPPED
Start: 2017-12-14 | End: 2018-01-13

## 2018-01-24 ENCOUNTER — HOSPITAL ENCOUNTER (OUTPATIENT)
Dept: RADIOLOGY | Facility: MEDICAL CENTER | Age: 43
End: 2018-01-24
Attending: UROLOGY
Payer: COMMERCIAL

## 2018-01-24 DIAGNOSIS — R97.20 ELEVATED PROSTATE SPECIFIC ANTIGEN (PSA): ICD-10-CM

## 2018-01-24 PROCEDURE — 700111 HCHG RX REV CODE 636 W/ 250 OVERRIDE (IP): Performed by: RADIOLOGY

## 2018-01-24 PROCEDURE — 700117 HCHG RX CONTRAST REV CODE 255: Performed by: UROLOGY

## 2018-01-24 PROCEDURE — A9579 GAD-BASE MR CONTRAST NOS,1ML: HCPCS | Performed by: UROLOGY

## 2018-01-24 PROCEDURE — 72197 MRI PELVIS W/O & W/DYE: CPT

## 2018-01-24 RX ADMIN — GADODIAMIDE 20 ML: 287 INJECTION INTRAVENOUS at 09:49

## 2018-01-24 RX ADMIN — GLUCAGON HYDROCHLORIDE 1 MG: KIT at 08:39

## 2018-02-28 ENCOUNTER — PATIENT MESSAGE (OUTPATIENT)
Dept: MEDICAL GROUP | Facility: PHYSICIAN GROUP | Age: 43
End: 2018-02-28

## 2018-03-10 ENCOUNTER — TELEPHONE (OUTPATIENT)
Dept: MEDICAL GROUP | Facility: PHYSICIAN GROUP | Age: 43
End: 2018-03-10

## 2018-03-10 DIAGNOSIS — E78.5 HYPERLIPIDEMIA, UNSPECIFIED HYPERLIPIDEMIA TYPE: ICD-10-CM

## 2018-03-10 DIAGNOSIS — R60.9 EDEMA, UNSPECIFIED TYPE: ICD-10-CM

## 2018-03-10 DIAGNOSIS — I10 ESSENTIAL HYPERTENSION: ICD-10-CM

## 2018-03-12 RX ORDER — HYDROCHLOROTHIAZIDE 12.5 MG/1
TABLET ORAL
Qty: 90 TAB | Refills: 0 | Status: SHIPPED | OUTPATIENT
Start: 2018-03-12 | End: 2018-03-22 | Stop reason: SDUPTHER

## 2018-03-23 DIAGNOSIS — I10 ESSENTIAL HYPERTENSION: ICD-10-CM

## 2018-03-23 NOTE — TELEPHONE ENCOUNTER
Was the patient seen in the last year in this department? Yes     Does patient have an active prescription for medications requested? No     Received Request Via: Pharmacy      Pt met protocol?: Yes     Ov 11/17

## 2018-03-26 RX ORDER — AMLODIPINE BESYLATE 10 MG/1
TABLET ORAL
Qty: 90 TAB | Refills: 0 | Status: SHIPPED | OUTPATIENT
Start: 2018-03-26 | End: 2018-08-17 | Stop reason: SDUPTHER

## 2018-05-30 ENCOUNTER — HOSPITAL ENCOUNTER (OUTPATIENT)
Dept: LAB | Facility: MEDICAL CENTER | Age: 43
End: 2018-05-30
Attending: NURSE PRACTITIONER
Payer: COMMERCIAL

## 2018-05-30 DIAGNOSIS — E78.5 HYPERLIPIDEMIA, UNSPECIFIED HYPERLIPIDEMIA TYPE: ICD-10-CM

## 2018-05-30 DIAGNOSIS — I10 ESSENTIAL HYPERTENSION: ICD-10-CM

## 2018-05-30 LAB
25(OH)D3 SERPL-MCNC: 15 NG/ML (ref 30–100)
ALBUMIN SERPL BCP-MCNC: 4.4 G/DL (ref 3.2–4.9)
ALBUMIN/GLOB SERPL: 1.6 G/DL
ALP SERPL-CCNC: 48 U/L (ref 30–99)
ALT SERPL-CCNC: 25 U/L (ref 2–50)
ANION GAP SERPL CALC-SCNC: 9 MMOL/L (ref 0–11.9)
AST SERPL-CCNC: 13 U/L (ref 12–45)
BILIRUB SERPL-MCNC: 0.5 MG/DL (ref 0.1–1.5)
BUN SERPL-MCNC: 14 MG/DL (ref 8–22)
CALCIUM SERPL-MCNC: 9.3 MG/DL (ref 8.5–10.5)
CHLORIDE SERPL-SCNC: 106 MMOL/L (ref 96–112)
CHOLEST SERPL-MCNC: 241 MG/DL (ref 100–199)
CO2 SERPL-SCNC: 26 MMOL/L (ref 20–33)
CREAT SERPL-MCNC: 0.86 MG/DL (ref 0.5–1.4)
ERYTHROCYTE [DISTWIDTH] IN BLOOD BY AUTOMATED COUNT: 50 FL (ref 35.9–50)
GLOBULIN SER CALC-MCNC: 2.7 G/DL (ref 1.9–3.5)
GLUCOSE SERPL-MCNC: 78 MG/DL (ref 65–99)
HCT VFR BLD AUTO: 49.4 % (ref 42–52)
HDLC SERPL-MCNC: 50 MG/DL
HGB BLD-MCNC: 15.5 G/DL (ref 14–18)
LDLC SERPL CALC-MCNC: 151 MG/DL
MCH RBC QN AUTO: 30.1 PG (ref 27–33)
MCHC RBC AUTO-ENTMCNC: 31.4 G/DL (ref 33.7–35.3)
MCV RBC AUTO: 95.9 FL (ref 81.4–97.8)
PLATELET # BLD AUTO: 209 K/UL (ref 164–446)
PMV BLD AUTO: 11.6 FL (ref 9–12.9)
POTASSIUM SERPL-SCNC: 4.3 MMOL/L (ref 3.6–5.5)
PROT SERPL-MCNC: 7.1 G/DL (ref 6–8.2)
RBC # BLD AUTO: 5.15 M/UL (ref 4.7–6.1)
SODIUM SERPL-SCNC: 141 MMOL/L (ref 135–145)
TRIGL SERPL-MCNC: 201 MG/DL (ref 0–149)
TSH SERPL DL<=0.005 MIU/L-ACNC: 3.06 UIU/ML (ref 0.38–5.33)
WBC # BLD AUTO: 9 K/UL (ref 4.8–10.8)

## 2018-05-30 PROCEDURE — 80053 COMPREHEN METABOLIC PANEL: CPT

## 2018-05-30 PROCEDURE — 36415 COLL VENOUS BLD VENIPUNCTURE: CPT

## 2018-05-30 PROCEDURE — 85027 COMPLETE CBC AUTOMATED: CPT

## 2018-05-30 PROCEDURE — 84443 ASSAY THYROID STIM HORMONE: CPT

## 2018-05-30 PROCEDURE — 82306 VITAMIN D 25 HYDROXY: CPT

## 2018-05-30 PROCEDURE — 80061 LIPID PANEL: CPT

## 2018-06-06 RX ORDER — ERGOCALCIFEROL 1.25 MG/1
CAPSULE ORAL
Qty: 16 CAP | Refills: 0 | Status: SHIPPED | OUTPATIENT
Start: 2018-06-06

## 2018-07-17 ENCOUNTER — PATIENT MESSAGE (OUTPATIENT)
Dept: MEDICAL GROUP | Facility: PHYSICIAN GROUP | Age: 43
End: 2018-07-17

## 2018-07-17 RX ORDER — LOSARTAN POTASSIUM 25 MG/1
25 TABLET ORAL DAILY
Qty: 90 TAB | Refills: 3 | Status: SHIPPED | OUTPATIENT
Start: 2018-07-17 | End: 2018-07-20 | Stop reason: SDUPTHER

## 2018-07-20 ENCOUNTER — PATIENT MESSAGE (OUTPATIENT)
Dept: MEDICAL GROUP | Facility: PHYSICIAN GROUP | Age: 43
End: 2018-07-20

## 2018-07-20 DIAGNOSIS — I10 ESSENTIAL HYPERTENSION: ICD-10-CM

## 2018-07-20 RX ORDER — LOSARTAN POTASSIUM 25 MG/1
25 TABLET ORAL DAILY
Qty: 90 TAB | Refills: 3 | Status: SHIPPED
Start: 2018-07-20 | End: 2018-07-24 | Stop reason: SDUPTHER

## 2018-07-20 RX ORDER — LOSARTAN POTASSIUM 25 MG/1
25 TABLET ORAL DAILY
Qty: 90 TAB | Refills: 3 | Status: SHIPPED | OUTPATIENT
Start: 2018-07-20 | End: 2018-07-20 | Stop reason: SDUPTHER

## 2018-07-20 NOTE — TELEPHONE ENCOUNTER
From: Jeb Morley  To: Stefani Rodríguez M.D.  Sent: 7/20/2018 3:15 PM PDT  Subject: RE: Non-Urgent Medical Question    Stefani  So she just called the [address] [place] [address] and they said nothing was there for him & it is now 3:20.    ----- Message -----  From: Stefani Rodríguez M.D.  Sent: 7/20/18 10:13 AM  To: Jeb Morley  Subject: RE: Non-Urgent Medical Question    [facility] [address] [place] [address]. I will resend it.    Stefani    ----- Message -----   From: Jeb Morley   Sent: 7/17/2018 3:29 PM PDT   To: Stefani Rodríguez M.D.  Subject: RE: Non-Urgent Medical Question    Stefani  Yes that is what she is talking about. She just got a 90 day supply from [facility] so what do we do with that?  Jeb    ----- Message -----  From: Stefani Rodríguez M.D.  Sent: 7/17/18 3:17 PM  To: Jeb Morley  Subject: RE: Non-Urgent Medical Question    HI there. Marlo is out currently. I don't see which medication you are referring to (I assume Diovan aka Valsartan) on his medication list. Can you just confirm that is one that he was taking. We can switch it to another drug.    Stefani    ----- Message -----   From: Jeb Morley   Sent: 7/17/2018 9:38 AM PDT   To: SONI Fuller  Subject: Non-Urgent Medical Question    Morning Marlo,   So mom is having a fit about the news that dad's blood pressure meds are being pulled off the market, so she has been on the phone with the pharmacy they said yes it and for her to contact her doctor! Yeah kenyatta is rolling down hill and your the catcher! So can you call her or let me know what to have her do now. Her number is [phone] thank you!

## 2018-07-24 ENCOUNTER — PATIENT MESSAGE (OUTPATIENT)
Dept: MEDICAL GROUP | Facility: PHYSICIAN GROUP | Age: 43
End: 2018-07-24

## 2018-07-24 RX ORDER — LOSARTAN POTASSIUM 25 MG/1
25 TABLET ORAL DAILY
Qty: 90 TAB | Refills: 3 | Status: SHIPPED | OUTPATIENT
Start: 2018-07-24 | End: 2020-03-16

## 2018-07-24 RX ORDER — LOSARTAN POTASSIUM 25 MG/1
25 TABLET ORAL DAILY
Qty: 90 TAB | Refills: 3 | Status: SHIPPED | OUTPATIENT
Start: 2018-07-24 | End: 2018-07-24 | Stop reason: SDUPTHER

## 2018-07-25 NOTE — TELEPHONE ENCOUNTER
From: Jeb Morley  To: Stefani Rodríguez M.D.  Sent: 7/24/2018 3:41 PM PDT  Subject: RE: Non-Urgent Medical Question    Stefani   Ok I know I'm being a pain in the ass! But as of today at 3:30 [facility] on [facility] says they still don't have a prescription for Dad! Do you think we can send it to the [facility] on [facility] instead!   Jeb  ----- Message -----  From: Stefani Rodríguez M.D.  Sent: 7/20/18 3:19 PM  To: Jeb Morley  Subject: RE: Non-Urgent Medical Question    OH holy moly! I sent it about 3 times now. I will fax it and call it in. Sorry for the technical difficulty.     ----- Message -----   From: Jeb Morley   Sent: 7/20/2018 3:15 PM PDT   To: Stefani Rodríguez M.D.  Subject: RE: Non-Urgent Medical Question    Stefani  So she just called the [address] [place] [address] [facility] and they said nothing was there for him & it is now 3:20.    ----- Message -----  From: Stefani Rodríguez M.D.  Sent: 7/20/18 10:13 AM  To: Jeb Morley  Subject: RE: Non-Urgent Medical Question    [facility] to [facility] on [address]. I will resend it.    Stefani    ----- Message -----   From: Jeb Morley   Sent: 7/17/2018 3:29 PM PDT   To: Stefani Rodríguez M.D.  Subject: RE: Non-Urgent Medical Question    Stefani  Yes that is what she is talking about. She just got a 90 day supply from [facility] so what do we do with that?  Jeb    ----- Message -----  From: Stefani Rodríguez M.D.  Sent: 7/17/18 3:17 PM  To: Jeb Morley  Subject: RE: Non-Urgent Medical Question    HI there. Marlo is out currently. I don't see which medication you are referring to (I assume Diovan aka Valsartan) on his medication list. Can you just confirm that is one that he was taking. We can switch it to another drug.    Stefani    ----- Message -----   From: Jeb Morley   Sent: 7/17/2018 9:38 AM PDT   To: Marlo Barahona, A.P.N.  Subject: Non-Urgent Medical Question    Morning Marlo,   So mom is having a fit about the  news that dad's blood pressure meds are being pulled off the market, so she has been on the phone with the pharmacy they said yes it and for her to contact her doctor! Yeah leydithony is rolling down hill and your the catcher! So can you call her or let me know what to have her do now. Her number is [phone] thank you!

## 2018-09-06 ENCOUNTER — OFFICE VISIT (OUTPATIENT)
Dept: MEDICAL GROUP | Facility: PHYSICIAN GROUP | Age: 43
End: 2018-09-06
Payer: COMMERCIAL

## 2018-09-06 VITALS
SYSTOLIC BLOOD PRESSURE: 130 MMHG | HEIGHT: 72 IN | DIASTOLIC BLOOD PRESSURE: 80 MMHG | WEIGHT: 295 LBS | HEART RATE: 99 BPM | TEMPERATURE: 98.3 F | RESPIRATION RATE: 16 BRPM | BODY MASS INDEX: 39.96 KG/M2 | OXYGEN SATURATION: 97 %

## 2018-09-06 DIAGNOSIS — I10 ESSENTIAL HYPERTENSION: ICD-10-CM

## 2018-09-06 DIAGNOSIS — R79.89 LOW VITAMIN D LEVEL: ICD-10-CM

## 2018-09-06 DIAGNOSIS — E78.5 HYPERLIPIDEMIA, UNSPECIFIED HYPERLIPIDEMIA TYPE: ICD-10-CM

## 2018-09-06 DIAGNOSIS — N40.1 BENIGN PROSTATIC HYPERPLASIA WITH LOWER URINARY TRACT SYMPTOMS, SYMPTOM DETAILS UNSPECIFIED: ICD-10-CM

## 2018-09-06 PROCEDURE — 99214 OFFICE O/P EST MOD 30 MIN: CPT | Performed by: NURSE PRACTITIONER

## 2018-09-06 ASSESSMENT — PATIENT HEALTH QUESTIONNAIRE - PHQ9: CLINICAL INTERPRETATION OF PHQ2 SCORE: 0

## 2018-09-06 NOTE — PROGRESS NOTES
Chief Complaint   Patient presents with   • Results       HISTORY OF PRESENT ILLNESS: Patient is a 43 y.o. male established patient who presents today to discuss the following issues:    BMI 40.0-44.9, adult ([facility])  Patient is aware of BMI elevation.  Brief discussion of diet, exercise, and lifestyle modification.      Hyperlipidemia  Chronic in nature.  Stable on meds.  Due for labs.    Essential hypertension  Chronic in nature.  Stable on meds.      Benign prostatic hyperplasia with lower urinary tract symptoms  Followed by urology.      Patient Active Problem List    Diagnosis Date Noted   • BMI 40.0-44.9, adult ([facility]) 10/08/2018   • Benign prostatic hyperplasia with lower urinary tract symptoms 11/08/2017   • Essential hypertension 12/08/2016   • Hyperlipidemia 12/08/2016       Allergies:Patient has no known allergies.    Current Outpatient Prescriptions   Medication Sig Dispense Refill   • lovastatin (MEVACOR) 20 MG Tab TAKE ONE TABLET BY MOUTH ONCE DAILY 90 Tab 0   • amLODIPine (NORVASC) 10 MG Tab TAKE ONE TABLET BY MOUTH ONCE DAILY 90 Tab 0   • hydroCHLOROthiazide (HYDRODIURIL) 12.5 MG tablet TAKE ONE TABLET BY MOUTH ONCE DAILY 90 Tab 0   • losartan (COZAAR) 25 MG Tab Take 1 Tab by mouth every day. 90 Tab 3   • ergocalciferol (DRISDOL) 05284 UNIT capsule Take 1 cap by mouth every day for 10 days, and then take 1 cap by mouth every Tuesday and Saturday until prescription is completed. 16 Cap 0   • oseltamivir (TAMIFLU) 75 MG Cap Take 1 Cap by mouth 2 times a day. 10 Cap 0   • vitamin D (CHOLECALCIFEROL) 1000 UNIT Tab Take 1,000 Units by mouth every day.     • oxybutynin SR (DITROPAN-XL) 10 MG CR tablet Take 10 mg by mouth every day.     • tamsulosin (FLOMAX) 0.4 MG capsule Take 0.4 mg by mouth ONE-HALF HOUR AFTER BREAKFAST.       No current facility-administered medications for this visit.        Social History   Substance Use Topics   • Smoking status: Never Smoker   • Smokeless tobacco: Never Used   • Alcohol  use 0.0 oz/week      Comment: socially       No family status information on file.   History reviewed. No pertinent family history.    Review of Systems:   Constitutional: Negative for fever, chills, weight loss and malaise/fatigue.   HENT: Negative for ear pain, nosebleeds, congestion, sore throat and neck pain.    Eyes: Negative for blurred vision.   Respiratory: Negative for cough, sputum production, shortness of breath and wheezing.    Cardiovascular: Negative for chest pain, palpitations, orthopnea and leg swelling.   Gastrointestinal: Negative for heartburn, nausea, vomiting and abdominal pain.   Genitourinary: Negative for dysuria, urgency and frequency.   Musculoskeletal: Negative for myalgias, joint pain, and back pain.  Skin: Negative for rash and itching.   Neurological: Negative for dizziness, tingling, tremors, sensory change, focal weakness and headaches.   Endo/Heme/Allergies: Does not bruise/bleed easily.   Psychiatric/Behavioral: Negative for depression, suicidal ideas and memory loss.  The patient is not nervous/anxious and does not have insomnia.    All other systems reviewed and are negative except as in HPI.    Exam:  Blood pressure 130/80, pulse 99, temperature 36.8 °C (98.3 °F), resp. rate 16, height 1.829 m (6'), weight (!) 133.8 kg (295 lb), SpO2 97 %.  General:  Well nourished, well developed male in NAD  Head: Grossly normal.  Neck: Supple without JVD or bruit. Thyroid is not enlarged.  Pulmonary: Clear to ausculation. Normal effort. No rales, ronchi, or wheezing.  Cardiovascular: Regular rate and rhythm without murmur.   Abdomen:  Soft, nontender, nondistended.  Extremities: No clubbing, cyanosis, or edema.  Skin: Intact with no obvious rashes or lesions.  Neuro: Grossly intact.  Psych: Alert and oriented x 3.  Mood and affect appropriate.    Medical decision-making and discussion: Jeb is here today for follow-upLab work was ordered, and he will follow-up here as  needed.          Assessment/Plan:  1. BMI 40.0-44.9, adult ([facility]CC)  Patient identified as having weight management issue.  Appropriate orders and counseling given.   2. Hyperlipidemia, unspecified hyperlipidemia type  LIPID PROFILE   3. Low vitamin D level  VITAMIN D,25 HYDROXY   4. Essential hypertension     5. Benign prostatic hyperplasia with lower urinary tract symptoms, symptom details unspecified         Return if symptoms worsen or fail to improve.    Please note that this dictation was created using voice recognition software. I have made every reasonable attempt to correct obvious errors, but I expect that there are errors of grammar and possibly content that I did not discover before finalizing the note.

## 2018-11-21 DIAGNOSIS — I10 ESSENTIAL HYPERTENSION: ICD-10-CM

## 2018-11-21 DIAGNOSIS — R60.9 EDEMA, UNSPECIFIED TYPE: ICD-10-CM

## 2018-11-21 DIAGNOSIS — E78.5 HYPERLIPIDEMIA, UNSPECIFIED HYPERLIPIDEMIA TYPE: ICD-10-CM

## 2018-11-26 RX ORDER — HYDROCHLOROTHIAZIDE 12.5 MG/1
TABLET ORAL
Refills: 0 | OUTPATIENT
Start: 2018-11-26

## 2018-11-26 RX ORDER — LOVASTATIN 20 MG/1
TABLET ORAL
Refills: 0 | OUTPATIENT
Start: 2018-11-26

## 2018-11-26 RX ORDER — AMLODIPINE BESYLATE 10 MG/1
TABLET ORAL
Refills: 0 | OUTPATIENT
Start: 2018-11-26

## 2018-12-18 ENCOUNTER — HOSPITAL ENCOUNTER (OUTPATIENT)
Dept: LAB | Facility: MEDICAL CENTER | Age: 43
End: 2018-12-18
Attending: NURSE PRACTITIONER
Payer: COMMERCIAL

## 2018-12-18 DIAGNOSIS — E78.5 HYPERLIPIDEMIA, UNSPECIFIED HYPERLIPIDEMIA TYPE: ICD-10-CM

## 2018-12-18 DIAGNOSIS — R79.89 LOW VITAMIN D LEVEL: ICD-10-CM

## 2018-12-18 LAB
25(OH)D3 SERPL-MCNC: 33 NG/ML (ref 30–100)
CHOLEST SERPL-MCNC: 217 MG/DL (ref 100–199)
FASTING STATUS PATIENT QL REPORTED: NORMAL
HDLC SERPL-MCNC: 44 MG/DL
LDLC SERPL CALC-MCNC: 152 MG/DL
TRIGL SERPL-MCNC: 107 MG/DL (ref 0–149)

## 2018-12-18 PROCEDURE — 36415 COLL VENOUS BLD VENIPUNCTURE: CPT

## 2018-12-18 PROCEDURE — 80061 LIPID PANEL: CPT

## 2018-12-18 PROCEDURE — 82306 VITAMIN D 25 HYDROXY: CPT

## 2019-01-13 ENCOUNTER — OFFICE VISIT (OUTPATIENT)
Dept: URGENT CARE | Facility: MEDICAL CENTER | Age: 44
End: 2019-01-13
Payer: COMMERCIAL

## 2019-01-13 VITALS
DIASTOLIC BLOOD PRESSURE: 98 MMHG | WEIGHT: 295 LBS | SYSTOLIC BLOOD PRESSURE: 124 MMHG | TEMPERATURE: 97.9 F | RESPIRATION RATE: 16 BRPM | HEART RATE: 97 BPM | OXYGEN SATURATION: 96 % | BODY MASS INDEX: 39.96 KG/M2 | HEIGHT: 72 IN

## 2019-01-13 DIAGNOSIS — J02.9 PHARYNGITIS, UNSPECIFIED ETIOLOGY: ICD-10-CM

## 2019-01-13 PROCEDURE — 99214 OFFICE O/P EST MOD 30 MIN: CPT | Performed by: FAMILY MEDICINE

## 2019-01-13 RX ORDER — AMOXICILLIN 875 MG/1
875 TABLET, COATED ORAL EVERY 12 HOURS
Qty: 20 TAB | Refills: 0 | Status: SHIPPED | OUTPATIENT
Start: 2019-01-13 | End: 2019-01-23

## 2019-01-13 ASSESSMENT — ENCOUNTER SYMPTOMS
SHORTNESS OF BREATH: 0
FOCAL WEAKNESS: 0
CHILLS: 0
HEMOPTYSIS: 0
DIZZINESS: 0
FEVER: 0
ORTHOPNEA: 0

## 2019-01-13 NOTE — PROGRESS NOTES
Subjective:      Jeb Morley is a 44 y.o. male who presents with Pharyngitis (hurts to swallow, ear discomfort, x 2 days )    - This is a very pleasant, well and non-toxic appearing 44 y.o. male with complaints of sore throat x 2 days. No NVFC          ALLERGIES:  Patient has no known allergies.     PMH:  History reviewed. No pertinent past medical history.     PSH:  History reviewed. No pertinent surgical history.    MEDS:    Current Outpatient Prescriptions:   •  amoxicillin (AMOXIL) 875 MG tablet, Take 1 Tab by mouth every 12 hours for 10 days., Disp: 20 Tab, Rfl: 0  •  amLODIPine (NORVASC) 10 MG Tab, TAKE 1 TABLET BY MOUTH EVERY DAY, Disp: 90 Tab, Rfl: 0  •  hydroCHLOROthiazide (HYDRODIURIL) 12.5 MG tablet, TAKE 1 TABLET BY MOUTH EVERY DAY, Disp: 90 Tab, Rfl: 0  •  lovastatin (MEVACOR) 20 MG Tab, TAKE 1 TABLET BY MOUTH EVERY DAY, Disp: 90 Tab, Rfl: 0  •  losartan (COZAAR) 25 MG Tab, Take 1 Tab by mouth every day., Disp: 90 Tab, Rfl: 3  •  ergocalciferol (DRISDOL) 38096 UNIT capsule, Take 1 cap by mouth every day for 10 days, and then take 1 cap by mouth every Tuesday and Saturday until prescription is completed., Disp: 16 Cap, Rfl: 0  •  oseltamivir (TAMIFLU) 75 MG Cap, Take 1 Cap by mouth 2 times a day., Disp: 10 Cap, Rfl: 0  •  vitamin D (CHOLECALCIFEROL) 1000 UNIT Tab, Take 1,000 Units by mouth every day., Disp: , Rfl:   •  oxybutynin SR (DITROPAN-XL) 10 MG CR tablet, Take 10 mg by mouth every day., Disp: , Rfl:   •  tamsulosin (FLOMAX) 0.4 MG capsule, Take 0.4 mg by mouth ONE-HALF HOUR AFTER BREAKFAST., Disp: , Rfl:     ** I have documented what I find to be significant in regards to past medical, social, family and surgical history  in my HPI or under PMH/PSH/FH review section, otherwise it is contributory **             HPI    Review of Systems   Constitutional: Negative for chills and fever.   Respiratory: Negative for hemoptysis and shortness of breath.    Cardiovascular: Negative for chest pain and  orthopnea.   Neurological: Negative for dizziness and focal weakness.          Objective:     /98   Pulse 97   Temp 36.6 °C (97.9 °F)   Resp 16   Ht 1.829 m (6')   Wt (!) 133.8 kg (295 lb)   SpO2 96%   BMI 40.01 kg/m²      Physical Exam   Constitutional: He appears well-developed. No distress.   HENT:   Head: Normocephalic and atraumatic.   Mouth/Throat: Oropharyngeal exudate present.   Eyes: Conjunctivae are normal.   Neck: Neck supple.   Cardiovascular: Regular rhythm.    No murmur heard.  Pulmonary/Chest: Effort normal. No respiratory distress.   Neurological: He is alert. He exhibits normal muscle tone.   Skin: Skin is warm and dry.   Psychiatric: He has a normal mood and affect. Judgment normal.   Nursing note and vitals reviewed.              Assessment/Plan:         1. Pharyngitis, unspecified etiology  amoxicillin (AMOXIL) 875 MG tablet             Dx & d/c instructions discussed w/ patient and/or family members.     ER precautions (worsening signs symptoms and when to go to ER) discussed.    Follow up w/ PCP in 2-3 days to make sure symptoms improving and no further intervention/treatment and/or work-up needed was advised, ER if feeling worse or not improving in 2 days.    Possible side effects (i.e. Rash, GI upset/constipation, sedation, elevation of BP or sugars) of any medications given discussed.     Patient left in stable condition

## 2019-01-18 ENCOUNTER — OFFICE VISIT (OUTPATIENT)
Dept: URGENT CARE | Facility: PHYSICIAN GROUP | Age: 44
End: 2019-01-18
Payer: COMMERCIAL

## 2019-01-18 VITALS
OXYGEN SATURATION: 95 % | HEART RATE: 99 BPM | HEIGHT: 72 IN | SYSTOLIC BLOOD PRESSURE: 130 MMHG | BODY MASS INDEX: 39.96 KG/M2 | WEIGHT: 295 LBS | DIASTOLIC BLOOD PRESSURE: 92 MMHG | RESPIRATION RATE: 16 BRPM | TEMPERATURE: 97.9 F

## 2019-01-18 DIAGNOSIS — J06.9 VIRAL URI WITH COUGH: ICD-10-CM

## 2019-01-18 DIAGNOSIS — H10.9 BACTERIAL CONJUNCTIVITIS OF RIGHT EYE: ICD-10-CM

## 2019-01-18 PROCEDURE — 99214 OFFICE O/P EST MOD 30 MIN: CPT | Performed by: PHYSICIAN ASSISTANT

## 2019-01-18 RX ORDER — POLYMYXIN B SULFATE AND TRIMETHOPRIM 1; 10000 MG/ML; [USP'U]/ML
1 SOLUTION OPHTHALMIC 4 TIMES DAILY
Qty: 10 ML | Refills: 0 | Status: SHIPPED | OUTPATIENT
Start: 2019-01-18 | End: 2019-01-25

## 2019-01-18 RX ORDER — BENZONATATE 100 MG/1
100 CAPSULE ORAL 3 TIMES DAILY PRN
Qty: 30 CAP | Refills: 0 | Status: SHIPPED | OUTPATIENT
Start: 2019-01-18 | End: 2020-03-16

## 2019-01-18 ASSESSMENT — ENCOUNTER SYMPTOMS
DIZZINESS: 0
HEADACHES: 1
EYE DISCHARGE: 1
SORE THROAT: 0
RHINORRHEA: 1
SINUS PAIN: 1
WHEEZING: 0
FEVER: 0
CHILLS: 0
VOMITING: 0
SPUTUM PRODUCTION: 0
EYE REDNESS: 1
ABDOMINAL PAIN: 0
SHORTNESS OF BREATH: 0
COUGH: 1
PALPITATIONS: 0
SWOLLEN GLANDS: 0
NAUSEA: 0
CONSTIPATION: 0
DIARRHEA: 0
MYALGIAS: 0

## 2019-01-18 NOTE — PROGRESS NOTES
"Subjective:      Jeb Morley is a 44 y.o. male who presents with Sore Throat (x1 week cough, goopy R eye)      URI    This is a new problem. The current episode started 1 to 4 weeks ago. The problem has been gradually improving (Cough got slightly worse and he developed which right eye redness/drainage yesterday). There has been no fever. Associated symptoms include congestion, coughing, headaches, rhinorrhea and sinus pain. Pertinent negatives include no abdominal pain, chest pain, diarrhea, ear pain, nausea, sore throat, swollen glands, vomiting or wheezing. He has tried decongestant for the symptoms. The treatment provided mild relief.   Patient was seen on 1/13/2019 for pharyngitis.  He said he was treated with a course of amoxicillin and that he no longer has a sore throat.  He says that his cough has worsened a little bit since that time, however, and yesterday he noticed that his right eye was red this morning when he woke up it was \"stuck shut\".  He says mostly though that he feels like he is gradually improving.      Review of Systems   Constitutional: Positive for malaise/fatigue. Negative for chills and fever.   HENT: Positive for congestion, rhinorrhea and sinus pain. Negative for ear pain and sore throat.    Eyes: Positive for discharge and redness.   Respiratory: Positive for cough. Negative for sputum production, shortness of breath and wheezing.    Cardiovascular: Negative for chest pain and palpitations.   Gastrointestinal: Negative for abdominal pain, constipation, diarrhea, nausea and vomiting.   Musculoskeletal: Negative for myalgias.   Neurological: Positive for headaches. Negative for dizziness.       PMH:  has no past medical history on file.  MEDS:   Current Outpatient Prescriptions:   •  polymixin-trimethoprim (POLYTRIM) 98255-4.1 UNIT/ML-% Solution, Place 1 Drop in both eyes 4 times a day for 7 days., Disp: 10 mL, Rfl: 0  •  benzonatate (TESSALON) 100 MG Cap, Take 1 Cap by mouth 3 times " a day as needed for Cough., Disp: 30 Cap, Rfl: 0  •  amoxicillin (AMOXIL) 875 MG tablet, Take 1 Tab by mouth every 12 hours for 10 days., Disp: 20 Tab, Rfl: 0  •  amLODIPine (NORVASC) 10 MG Tab, TAKE 1 TABLET BY MOUTH EVERY DAY, Disp: 90 Tab, Rfl: 0  •  hydroCHLOROthiazide (HYDRODIURIL) 12.5 MG tablet, TAKE 1 TABLET BY MOUTH EVERY DAY, Disp: 90 Tab, Rfl: 0  •  lovastatin (MEVACOR) 20 MG Tab, TAKE 1 TABLET BY MOUTH EVERY DAY, Disp: 90 Tab, Rfl: 0  •  losartan (COZAAR) 25 MG Tab, Take 1 Tab by mouth every day., Disp: 90 Tab, Rfl: 3  •  ergocalciferol (DRISDOL) 80853 UNIT capsule, Take 1 cap by mouth every day for 10 days, and then take 1 cap by mouth every Tuesday and Saturday until prescription is completed., Disp: 16 Cap, Rfl: 0  •  oseltamivir (TAMIFLU) 75 MG Cap, Take 1 Cap by mouth 2 times a day., Disp: 10 Cap, Rfl: 0  •  vitamin D (CHOLECALCIFEROL) 1000 UNIT Tab, Take 1,000 Units by mouth every day., Disp: , Rfl:   •  oxybutynin SR (DITROPAN-XL) 10 MG CR tablet, Take 10 mg by mouth every day., Disp: , Rfl:   •  tamsulosin (FLOMAX) 0.4 MG capsule, Take 0.4 mg by mouth ONE-HALF HOUR AFTER BREAKFAST., Disp: , Rfl:   ALLERGIES: No Known Allergies  SURGHX: No past surgical history on file.  SOCHX:  reports that he has never smoked. He has never used smokeless tobacco. He reports that he drinks alcohol. He reports that he does not use drugs.  FH: Family history was reviewed, no pertinent findings to report     Objective:     /92 (BP Location: Right arm, Patient Position: Sitting, BP Cuff Size: Large adult)   Pulse 99   Temp 36.6 °C (97.9 °F) (Temporal)   Resp 16   Ht 1.829 m (6')   Wt (!) 133.8 kg (295 lb)   SpO2 95%   BMI 40.01 kg/m²        Physical Exam   Constitutional: He is oriented to person, place, and time. He appears well-developed and well-nourished.   HENT:   Head: Normocephalic and atraumatic.   Right Ear: Tympanic membrane, external ear and ear canal normal.   Left Ear: Tympanic membrane,  external ear and ear canal normal.   Nose: Mucosal edema and rhinorrhea present. Right sinus exhibits no maxillary sinus tenderness and no frontal sinus tenderness. Left sinus exhibits no maxillary sinus tenderness and no frontal sinus tenderness.   Mouth/Throat: Uvula is midline, oropharynx is clear and moist and mucous membranes are normal. No posterior oropharyngeal erythema. Tonsils are 1+ on the right. Tonsils are 1+ on the left. No tonsillar exudate.   Eyes: Pupils are equal, round, and reactive to light. EOM are normal. Right conjunctiva is injected.   Neck: Normal range of motion.   Cardiovascular: Normal rate, regular rhythm and normal heart sounds.    No murmur heard.  Pulmonary/Chest: Effort normal and breath sounds normal. He has no wheezes.   Lymphadenopathy:     He has no cervical adenopathy.   Neurological: He is alert and oriented to person, place, and time.   Skin: Skin is warm and dry. Capillary refill takes less than 2 seconds.   Psychiatric: He has a normal mood and affect. His behavior is normal. Judgment normal.          Assessment/Plan:     1. Bacterial conjunctivitis of right eye  - polymixin-trimethoprim (POLYTRIM) 28566-1.1 UNIT/ML-% Solution; Place 1 Drop in both eyes 4 times a day for 7 days.  Dispense: 10 mL; Refill: 0    2. Viral URI with cough  - benzonatate (TESSALON) 100 MG Cap; Take 1 Cap by mouth 3 times a day as needed for Cough.  Dispense: 30 Cap; Refill: 0  - OTC cold/flu medications  - PO fluids  - Rest      Differential Diagnosis, natural history, and supportive care discussed. Return to the Urgent Care or follow up with your PCP if symptoms fail to resolve, or for any new or worsening symptoms. Emergency room precautions discussed. Patient and/or family appears understanding of information.

## 2019-01-29 ENCOUNTER — HOSPITAL ENCOUNTER (OUTPATIENT)
Dept: LAB | Facility: MEDICAL CENTER | Age: 44
End: 2019-01-29
Attending: UROLOGY
Payer: COMMERCIAL

## 2019-01-29 PROCEDURE — 84154 ASSAY OF PSA FREE: CPT

## 2019-01-29 PROCEDURE — 84153 ASSAY OF PSA TOTAL: CPT

## 2019-01-29 PROCEDURE — 36415 COLL VENOUS BLD VENIPUNCTURE: CPT

## 2019-01-31 LAB
PSA FREE MFR SERPL: 13 %
PSA FREE SERPL-MCNC: 0.4 NG/ML
PSA SERPL-MCNC: 3 NG/ML (ref 0–4)

## 2019-02-15 DIAGNOSIS — I10 ESSENTIAL HYPERTENSION: ICD-10-CM

## 2019-02-15 DIAGNOSIS — R60.9 EDEMA, UNSPECIFIED TYPE: ICD-10-CM

## 2019-02-15 DIAGNOSIS — E78.5 HYPERLIPIDEMIA, UNSPECIFIED HYPERLIPIDEMIA TYPE: ICD-10-CM

## 2019-02-19 RX ORDER — HYDROCHLOROTHIAZIDE 12.5 MG/1
TABLET ORAL
Qty: 90 TAB | Refills: 1 | Status: SHIPPED | OUTPATIENT
Start: 2019-02-19 | End: 2019-09-13 | Stop reason: SDUPTHER

## 2019-02-19 RX ORDER — AMLODIPINE BESYLATE 10 MG/1
TABLET ORAL
Qty: 90 TAB | Refills: 1 | Status: SHIPPED | OUTPATIENT
Start: 2019-02-19 | End: 2019-09-05 | Stop reason: SDUPTHER

## 2019-02-19 RX ORDER — LOVASTATIN 20 MG/1
TABLET ORAL
Qty: 90 TAB | Refills: 1 | Status: SHIPPED | OUTPATIENT
Start: 2019-02-19 | End: 2019-08-16 | Stop reason: SDUPTHER

## 2019-02-19 NOTE — TELEPHONE ENCOUNTER
Was the patient seen in the last year in this department? Yes    Does patient have an active prescription for medications requested? No     Received Request Via: Pharmacy      Pt met protocol?: Yes    OV 9/18     BP Readings from Last 1 Encounters:   01/18/19 130/92

## 2019-02-19 NOTE — TELEPHONE ENCOUNTER
Pt has had OV within the 12 month protocol and lipid panel is current. 6 month supply sent to pharmacy.   Lab Results   Component Value Date/Time    CHOLSTRLTOT 217 (H) 12/18/2018 07:35 AM     (H) 12/18/2018 07:35 AM    HDL 44 12/18/2018 07:35 AM    TRIGLYCERIDE 107 12/18/2018 07:35 AM       Lab Results   Component Value Date/Time    SODIUM 141 05/30/2018 08:02 AM    POTASSIUM 4.3 05/30/2018 08:02 AM    CHLORIDE 106 05/30/2018 08:02 AM    CO2 26 05/30/2018 08:02 AM    GLUCOSE 78 05/30/2018 08:02 AM    BUN 14 05/30/2018 08:02 AM    CREATININE 0.86 05/30/2018 08:02 AM     Lab Results   Component Value Date/Time    ALKPHOSPHAT 48 05/30/2018 08:02 AM    ASTSGOT 13 05/30/2018 08:02 AM    ALTSGPT 25 05/30/2018 08:02 AM    TBILIRUBIN 0.5 05/30/2018 08:02 AM

## 2019-08-16 DIAGNOSIS — E78.5 HYPERLIPIDEMIA, UNSPECIFIED HYPERLIPIDEMIA TYPE: ICD-10-CM

## 2019-08-16 RX ORDER — LOVASTATIN 20 MG/1
TABLET ORAL
Qty: 90 TAB | Refills: 1 | Status: SHIPPED | OUTPATIENT
Start: 2019-08-16 | End: 2020-02-20 | Stop reason: SDUPTHER

## 2020-02-20 DIAGNOSIS — E78.5 HYPERLIPIDEMIA, UNSPECIFIED HYPERLIPIDEMIA TYPE: Primary | ICD-10-CM

## 2020-02-20 NOTE — TELEPHONE ENCOUNTER
Was the patient seen in the last year in this department? Yes    Does patient have an active prescription for medications requested? No     Received Request Via: Pharmacy      Pt met protocol?: Yes, OV 9/18   Lab Results   Component Value Date/Time    CHOLSTRLTOT 217 (H) 12/18/2018 07:35 AM     (H) 12/18/2018 07:35 AM    HDL 44 12/18/2018 07:35 AM    TRIGLYCERIDE 107 12/18/2018 07:35 AM       Lab Results   Component Value Date/Time    SODIUM 141 05/30/2018 08:02 AM    POTASSIUM 4.3 05/30/2018 08:02 AM    CHLORIDE 106 05/30/2018 08:02 AM    CO2 26 05/30/2018 08:02 AM    GLUCOSE 78 05/30/2018 08:02 AM    BUN 14 05/30/2018 08:02 AM    CREATININE 0.86 05/30/2018 08:02 AM     Lab Results   Component Value Date/Time    ALKPHOSPHAT 48 05/30/2018 08:02 AM    ASTSGOT 13 05/30/2018 08:02 AM    ALTSGPT 25 05/30/2018 08:02 AM    TBILIRUBIN 0.5 05/30/2018 08:02 AM

## 2020-02-21 RX ORDER — LOVASTATIN 20 MG/1
20 TABLET ORAL
Qty: 90 TAB | Refills: 0 | Status: SHIPPED | OUTPATIENT
Start: 2020-02-21 | End: 2020-03-16 | Stop reason: SDUPTHER

## 2020-03-03 DIAGNOSIS — I10 ESSENTIAL HYPERTENSION: ICD-10-CM

## 2020-03-03 DIAGNOSIS — R60.9 EDEMA, UNSPECIFIED TYPE: ICD-10-CM

## 2020-03-04 RX ORDER — AMLODIPINE BESYLATE 10 MG/1
TABLET ORAL
Qty: 90 TAB | Refills: 0 | OUTPATIENT
Start: 2020-03-04

## 2020-03-04 RX ORDER — HYDROCHLOROTHIAZIDE 12.5 MG/1
12.5 TABLET ORAL
Qty: 90 TAB | Refills: 0 | OUTPATIENT
Start: 2020-03-04

## 2020-03-04 RX ORDER — AMLODIPINE BESYLATE 10 MG/1
10 TABLET ORAL
Qty: 90 TAB | Refills: 0 | OUTPATIENT
Start: 2020-03-04

## 2020-03-05 DIAGNOSIS — I10 ESSENTIAL HYPERTENSION: ICD-10-CM

## 2020-03-05 RX ORDER — AMLODIPINE BESYLATE 10 MG/1
10 TABLET ORAL
Qty: 90 TAB | Refills: 3 | Status: SHIPPED | OUTPATIENT
Start: 2020-03-05

## 2020-03-09 DIAGNOSIS — R60.9 EDEMA, UNSPECIFIED TYPE: ICD-10-CM

## 2020-03-09 RX ORDER — HYDROCHLOROTHIAZIDE 12.5 MG/1
12.5 TABLET ORAL
Qty: 90 TAB | Refills: 0 | Status: SHIPPED | OUTPATIENT
Start: 2020-03-09 | End: 2020-03-16 | Stop reason: SDUPTHER

## 2020-03-09 NOTE — TELEPHONE ENCOUNTER
Was the patient seen in the last year in this department? No     Does patient have an active prescription for medications requested? No     Received Request Via: Pharmacy      Pt met protocol?: No   Pt last ov 9/6/2018 has an upcoming appt 3/16/2020   BP Readings from Last 1 Encounters:   01/18/19 130/92

## 2020-03-11 ENCOUNTER — HOSPITAL ENCOUNTER (OUTPATIENT)
Dept: LAB | Facility: MEDICAL CENTER | Age: 45
End: 2020-03-11
Attending: NURSE PRACTITIONER
Payer: COMMERCIAL

## 2020-03-11 ENCOUNTER — HOSPITAL ENCOUNTER (OUTPATIENT)
Dept: LAB | Facility: MEDICAL CENTER | Age: 45
End: 2020-03-11
Attending: UROLOGY
Payer: COMMERCIAL

## 2020-03-11 DIAGNOSIS — E78.5 HYPERLIPIDEMIA, UNSPECIFIED HYPERLIPIDEMIA TYPE: ICD-10-CM

## 2020-03-11 PROCEDURE — 80061 LIPID PANEL: CPT

## 2020-03-11 PROCEDURE — 36415 COLL VENOUS BLD VENIPUNCTURE: CPT

## 2020-03-11 PROCEDURE — 84153 ASSAY OF PSA TOTAL: CPT

## 2020-03-12 LAB
CHOLEST SERPL-MCNC: 223 MG/DL (ref 100–199)
HDLC SERPL-MCNC: 45 MG/DL
LDLC SERPL CALC-MCNC: 141 MG/DL
PSA SERPL-MCNC: 2.74 NG/ML (ref 0–4)
TRIGL SERPL-MCNC: 185 MG/DL (ref 0–149)

## 2020-03-16 ENCOUNTER — OFFICE VISIT (OUTPATIENT)
Dept: MEDICAL GROUP | Facility: PHYSICIAN GROUP | Age: 45
End: 2020-03-16
Payer: COMMERCIAL

## 2020-03-16 VITALS
HEIGHT: 72 IN | RESPIRATION RATE: 16 BRPM | BODY MASS INDEX: 42.66 KG/M2 | OXYGEN SATURATION: 95 % | TEMPERATURE: 98.3 F | WEIGHT: 315 LBS | HEART RATE: 100 BPM | DIASTOLIC BLOOD PRESSURE: 82 MMHG | SYSTOLIC BLOOD PRESSURE: 138 MMHG

## 2020-03-16 DIAGNOSIS — I10 ESSENTIAL HYPERTENSION: ICD-10-CM

## 2020-03-16 DIAGNOSIS — R60.9 EDEMA, UNSPECIFIED TYPE: ICD-10-CM

## 2020-03-16 DIAGNOSIS — N40.1 BENIGN PROSTATIC HYPERPLASIA WITH LOWER URINARY TRACT SYMPTOMS, SYMPTOM DETAILS UNSPECIFIED: ICD-10-CM

## 2020-03-16 DIAGNOSIS — E78.5 HYPERLIPIDEMIA, UNSPECIFIED HYPERLIPIDEMIA TYPE: ICD-10-CM

## 2020-03-16 PROCEDURE — 99214 OFFICE O/P EST MOD 30 MIN: CPT | Performed by: NURSE PRACTITIONER

## 2020-03-16 RX ORDER — FINASTERIDE 5 MG/1
5 TABLET, FILM COATED ORAL DAILY
Qty: 90 TAB | Refills: 3 | Status: SHIPPED | OUTPATIENT
Start: 2020-03-16

## 2020-03-16 RX ORDER — TAMSULOSIN HYDROCHLORIDE 0.4 MG/1
0.4 CAPSULE ORAL DAILY
Qty: 90 CAP | Refills: 3 | Status: SHIPPED | OUTPATIENT
Start: 2020-03-16

## 2020-03-16 RX ORDER — HYDROCHLOROTHIAZIDE 12.5 MG/1
12.5 TABLET ORAL
Qty: 90 TAB | Refills: 3 | Status: SHIPPED | OUTPATIENT
Start: 2020-03-16 | End: 2020-06-23

## 2020-03-16 RX ORDER — OXYBUTYNIN CHLORIDE 10 MG/1
10 TABLET, EXTENDED RELEASE ORAL DAILY
Qty: 90 TAB | Refills: 3 | Status: SHIPPED | OUTPATIENT
Start: 2020-03-16

## 2020-03-16 RX ORDER — LOVASTATIN 20 MG/1
20 TABLET ORAL
Qty: 90 TAB | Refills: 3 | Status: SHIPPED | OUTPATIENT
Start: 2020-03-16

## 2020-03-16 ASSESSMENT — PATIENT HEALTH QUESTIONNAIRE - PHQ9: CLINICAL INTERPRETATION OF PHQ2 SCORE: 0

## 2020-03-16 ASSESSMENT — FIBROSIS 4 INDEX: FIB4 SCORE: 0.58

## 2020-03-16 NOTE — PROGRESS NOTES
Chief Complaint   Patient presents with   • Lab Results       HISTORY OF PRESENT ILLNESS: Patient is a 45 y.o. male established patient who presents today to discuss the following issues:    BMI 40.0-44.9, adult ([facility])  Patient is aware of BMI elevation.  Brief discussion of diet, exercise, and lifestyle modification.      Hyperlipidemia  Chronic in nature.  Stable on meds.  Due for refills.  Lab results reviewed.    Benign prostatic hyperplasia with lower urinary tract symptoms  Followed by Dr. Guerrero.  Due for refills.    Essential hypertension  Chronic in nature.  Stable on meds.  Due for refills.      Patient Active Problem List    Diagnosis Date Noted   • BMI 40.0-44.9, adult ([facility]) 10/08/2018   • Benign prostatic hyperplasia with lower urinary tract symptoms 11/08/2017   • Essential hypertension 12/08/2016   • Hyperlipidemia 12/08/2016       Allergies:Patient has no known allergies.    Current Outpatient Medications   Medication Sig Dispense Refill   • lovastatin (MEVACOR) 20 MG Tab Take 1 Tab by mouth every day. 90 Tab 3   • hydroCHLOROthiazide (HYDRODIURIL) 12.5 MG tablet Take 1 Tab by mouth every day. 90 Tab 3   • tamsulosin (FLOMAX) 0.4 MG capsule Take 1 Cap by mouth every day. 90 Cap 3   • oxybutynin SR (DITROPAN-XL) 10 MG CR tablet Take 1 Tab by mouth every day. 90 Tab 3   • finasteride (PROSCAR) 5 MG Tab Take 1 Tab by mouth every day. 90 Tab 3   • amLODIPine (NORVASC) 10 MG Tab Take 1 Tab by mouth every day. 90 Tab 3   • ergocalciferol (DRISDOL) 76390 UNIT capsule Take 1 cap by mouth every day for 10 days, and then take 1 cap by mouth every Tuesday and Saturday until prescription is completed. 16 Cap 0     No current facility-administered medications for this visit.        Social History     Tobacco Use   • Smoking status: Never Smoker   • Smokeless tobacco: Never Used   Substance Use Topics   • Alcohol use: Yes     Alcohol/week: 0.0 oz     Comment: socially   • Drug use: No       No family status  information on file.   History reviewed. No pertinent family history.    Review of Systems:   Constitutional: Negative for fever, chills, weight loss and malaise/fatigue.   HENT: Negative for ear pain, nosebleeds, congestion, sore throat and neck pain.    Eyes: Negative for blurred vision.   Respiratory: Negative for cough, sputum production, shortness of breath and wheezing.    Cardiovascular: Negative for chest pain, palpitations, orthopnea and leg swelling.   Gastrointestinal: Negative for heartburn, nausea, vomiting and abdominal pain.   Genitourinary: Negative for dysuria, urgency and frequency.   Musculoskeletal: Negative for myalgias, joint pain, and back pain.  Skin: Negative for rash and itching.   Neurological: Negative for dizziness, tingling, tremors, sensory change, focal weakness and headaches.   Endo/Heme/Allergies: Does not bruise/bleed easily.   Psychiatric/Behavioral: Negative for depression, suicidal ideas and memory loss.  The patient is not nervous/anxious and does not have insomnia.    All other systems reviewed and are negative except as in HPI.    Exam:  Blood Pressure 138/82   Pulse 100   Temperature 36.8 °C (98.3 °F)   Respiration 16   Height 1.829 m (6')   Weight (Abnormal) 143.8 kg (317 lb)   Oxygen Saturation 95%   General:  Well nourished, well developed male in NAD  Head: Grossly normal.  Neck: Supple without JVD or bruit. Thyroid is not enlarged.  Pulmonary: Clear to ausculation. Normal effort. No rales, ronchi, or wheezing.  Cardiovascular: Regular rate and rhythm without murmur.   Abdomen:  Soft, nontender, nondistended.  Extremities: No clubbing, cyanosis, or edema.  Skin: Intact with no obvious rashes or lesions.  Neuro: Grossly intact.  Psych: Alert and oriented x 3.  Mood and affect appropriate.    Medical decision-making and discussion: Jeb is here today for follow-up.  His chart was reviewed, test results were discussed, and his medications were refilled.  He will  follow-up here as needed.          Assessment/Plan:  1. BMI 40.0-44.9, adult ([facility])  Patient identified as having weight management issue.  Appropriate orders and counseling given.   2. Hyperlipidemia, unspecified hyperlipidemia type  lovastatin (MEVACOR) 20 MG Tab   3. Edema, unspecified type  hydroCHLOROthiazide (HYDRODIURIL) 12.5 MG tablet   4. Benign prostatic hyperplasia with lower urinary tract symptoms, symptom details unspecified  tamsulosin (FLOMAX) 0.4 MG capsule    oxybutynin SR (DITROPAN-XL) 10 MG CR tablet    finasteride (PROSCAR) 5 MG Tab   5. Essential hypertension         Return if symptoms worsen or fail to improve.    Please note that this dictation was created using voice recognition software. I have made every reasonable attempt to correct obvious errors, but I expect that there are errors of grammar and possibly content that I did not discover before finalizing the note.

## 2020-06-23 DIAGNOSIS — R60.9 EDEMA, UNSPECIFIED TYPE: ICD-10-CM

## 2020-06-23 RX ORDER — HYDROCHLOROTHIAZIDE 25 MG/1
25 TABLET ORAL
Qty: 90 TAB | Refills: 3 | Status: SHIPPED | OUTPATIENT
Start: 2020-06-23

## 2021-05-05 ENCOUNTER — HOSPITAL ENCOUNTER (OUTPATIENT)
Dept: LAB | Facility: MEDICAL CENTER | Age: 46
End: 2021-05-05
Attending: UROLOGY
Payer: COMMERCIAL

## 2021-05-05 LAB — PSA SERPL-MCNC: 2.21 NG/ML (ref 0–4)

## 2021-05-05 PROCEDURE — 36415 COLL VENOUS BLD VENIPUNCTURE: CPT

## 2021-05-05 PROCEDURE — 84153 ASSAY OF PSA TOTAL: CPT

## 2022-05-16 ENCOUNTER — HOSPITAL ENCOUNTER (OUTPATIENT)
Dept: LAB | Facility: MEDICAL CENTER | Age: 47
End: 2022-05-16
Attending: UROLOGY
Payer: COMMERCIAL

## 2022-05-16 LAB — PSA SERPL-MCNC: 2.55 NG/ML (ref 0–4)

## 2022-05-16 PROCEDURE — 84153 ASSAY OF PSA TOTAL: CPT

## 2022-05-16 PROCEDURE — 36415 COLL VENOUS BLD VENIPUNCTURE: CPT
